# Patient Record
Sex: MALE | Race: WHITE
[De-identification: names, ages, dates, MRNs, and addresses within clinical notes are randomized per-mention and may not be internally consistent; named-entity substitution may affect disease eponyms.]

---

## 2022-07-24 ENCOUNTER — HOSPITAL ENCOUNTER (INPATIENT)
Dept: HOSPITAL 94 - ADULT MH | Age: 57
LOS: 3 days | Discharge: HOME | DRG: 753 | End: 2022-07-27
Attending: PSYCHIATRY & NEUROLOGY | Admitting: PSYCHIATRY & NEUROLOGY
Payer: MEDICAID

## 2022-07-24 VITALS — DIASTOLIC BLOOD PRESSURE: 89 MMHG | SYSTOLIC BLOOD PRESSURE: 153 MMHG

## 2022-07-24 VITALS — SYSTOLIC BLOOD PRESSURE: 178 MMHG | DIASTOLIC BLOOD PRESSURE: 95 MMHG

## 2022-07-24 VITALS — WEIGHT: 252.87 LBS | HEIGHT: 65 IN | BODY MASS INDEX: 42.13 KG/M2

## 2022-07-24 DIAGNOSIS — Z28.310: ICD-10-CM

## 2022-07-24 DIAGNOSIS — F65.2: ICD-10-CM

## 2022-07-24 DIAGNOSIS — Z59.00: ICD-10-CM

## 2022-07-24 DIAGNOSIS — R45.851: ICD-10-CM

## 2022-07-24 DIAGNOSIS — F22: ICD-10-CM

## 2022-07-24 DIAGNOSIS — I10: ICD-10-CM

## 2022-07-24 DIAGNOSIS — F43.12: ICD-10-CM

## 2022-07-24 DIAGNOSIS — F31.9: Primary | ICD-10-CM

## 2022-07-24 DIAGNOSIS — Z79.899: ICD-10-CM

## 2022-07-24 DIAGNOSIS — E78.00: ICD-10-CM

## 2022-07-24 DIAGNOSIS — Z86.16: ICD-10-CM

## 2022-07-24 DIAGNOSIS — Z65.3: ICD-10-CM

## 2022-07-24 PROCEDURE — 80061 LIPID PANEL: CPT

## 2022-07-24 PROCEDURE — 36415 COLL VENOUS BLD VENIPUNCTURE: CPT

## 2022-07-24 PROCEDURE — 87081 CULTURE SCREEN ONLY: CPT

## 2022-07-24 PROCEDURE — 83036 HEMOGLOBIN GLYCOSYLATED A1C: CPT

## 2022-07-24 PROCEDURE — 99285 EMERGENCY DEPT VISIT HI MDM: CPT

## 2022-07-24 SDOH — ECONOMIC STABILITY - HOUSING INSECURITY: HOMELESSNESS UNSPECIFIED: Z59.00

## 2022-07-24 NOTE — NUR
Admit note:   [facility] for Gravely 
disabled from [facility] at 1330.  Pt endorses being attacked by demons, prophesizing a third 
war and creating a new constitution.  Pt is unwilling to clothe self. Pt is tangential, 
delusional and hallucinating.   Pt states having history of PTSD.

## 2022-07-25 VITALS — DIASTOLIC BLOOD PRESSURE: 71 MMHG | SYSTOLIC BLOOD PRESSURE: 130 MMHG

## 2022-07-25 VITALS — DIASTOLIC BLOOD PRESSURE: 87 MMHG | SYSTOLIC BLOOD PRESSURE: 130 MMHG

## 2022-07-25 LAB
CHOLEST SERPL-MCNC: 176 MG/DL (ref 0–200)
CHOLEST/HDLC SERPL: 4 {RATIO} (ref 0–4.99)
HBA1C MFR BLD: 5.7 % (ref 4.5–6.2)
HDLC SERPL-MCNC: 44 MG/DL (ref 35–60)
LDLC SERPL DIRECT ASSAY-MCNC: 108 MG/DL (ref 50–100)
TRIGL SERPL-MCNC: 96 MG/DL (ref 20–135)
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
## 2022-07-25 NOTE — NUR
Progress Note:



Problem: Pt admitted  to [facility] on a 5150 for Gravely disabled from 
[facility] .  Pt endorses being attacked by demons, prophesies a third war and creating a new 
constitution.  Pt is unwilling to clothe self. Pt is tangential, delusional and 
hallucinating.   Pt states having history of PTSD.



Intervention: Maintained a safe and supportive environment, administered scheduled and PRN 
medications. Provided clear and simple instructions, provided active listening and positive 
encouragement, encouraged participation on the unit.



Response:Patient in room sitting in chair staring out window at the beginning of the shift. 
The pt was holding a bible. Patient 1:1, pt states he sometimes hears voices, V/H, denies 
H/I S/I. The The patient began to become labile and reported that he wanted to go home and 
did not want to be here. The pt was then offered a sandwich to which he was extremely 
grateful. The patient was given snacks and water, an explanation of the evening schedule was 
given. The pt calmed down. The pt ate snack in the community room. The patient took evening 
meds w/o complications and went to bed shortly after.



Plan:  

Pt. requires crisis stabilization. pt. continues to require a safe and supportive 
environment. And pharmacological intervention.

## 2022-07-26 VITALS — SYSTOLIC BLOOD PRESSURE: 169 MMHG | DIASTOLIC BLOOD PRESSURE: 72 MMHG

## 2022-07-26 VITALS — SYSTOLIC BLOOD PRESSURE: 134 MMHG | DIASTOLIC BLOOD PRESSURE: 77 MMHG

## 2022-07-26 NOTE — NUR
NURSING PROGRESS NOTE: 



Problem: Patient was seen at [facility] and placed on a 5150 for DTS/GD. Patient 
reported "I am thinking about hurting myself. Patient presented delusional and tangential 
"Patient was thinking about throwing himself in front of a train.  Patient believed he was 
surrounded by Gogo gonzalez for the last 2 days.  Pt was hearing voices and felt he was 
being attacked by demons.

Interventions : Maintained a safe and supportive environment, administered medication per 
orders with no adverse side effects, provided clear and simple instructions, provided active 
listening and positive encouragement, reality orientation as needed, encouraged 
participation on the unit and with ADLs. 



Response :  Received patient sleeping at shift change. Pt woke as the sun was coming up. 
Noted he was standing in the sun by the window with his shirt off. Pts arms were raised and 
he was praising Michael. Pt was compliant with care and his morning heart medication. Pt 
presented sad during one on one assessment. When asked how he was pt stated I have had 
better days. Im just not sure what is going to happen. Pt continues I am afraid of 
going to hell. I am struggling with lust and I dont like it. Writer talked about 
different distraction techniques, such as walking, listening to head phones, exercise. Pt 
attended patio break with charge nurse and peers, states that was a good distraction. Pt 
continues to pace the black talking peers about the Lord. Pt said he was given a word 
colostomy. I then saw Adair and was watching his walk, I asked about it and he said he 
had a colostomy. Pt then said I prayed over him and told him he would be healed. Pt also 
told writer that one of his peers confided in him telling him their aunt was a practicing 
witch.



Plan : Pt is having delusions about demons and Satan worshipers. Pt requires mediation 
initiation and titration to a therapeutic level. Pt has some suicidal ideation and is unable 
to safety plan.

## 2022-07-26 NOTE — NUR
PSYCHOSOCIAL ASSESSMENT



Pt. is a 56 year old single male admitted on a 5150 for GD.  Pt endorses being attacked by 
demons, prophesizing a third war and creating a new constitution.  Pt is unwilling to clothe 
self. Pt is tangential, delusional and hallucinating.   Pt stated having history of PTSD.



Met with Pt. today. Pt. reported that he is currently on Probation in [facility] since 
last fall, he still has two more years until he completes his probation. [place] cannot leave the 
[place]. He reported he is homeless and would like to live with friends from [place] but does 
not have a plan for this at this time. He has not family in the area as his mom moved with 
his brother last year to [place]. Pt. grew up in [place], [place] but has been living in 
[place] since 2012. He reported he has no job and no income but does have some savings in 
his bank account that he lives off of. He reported that he does not care for the [facility] and 
does not plan on returning there. He denied using any substances, marijuana or alcohol. 



Spoke to Hope from [facility] who reported that he had been in a PHF in [facility] at 
the beginning of this year. His Medi-[place]arek got changed to [place] during this time, he never 
lived in that county. He reported he doesnt know how to change his Medi-Darek back. Will give 
him the number for Medi-darek for him to call. He reported he doesnt have a PCP or any outpt. 
Mental health services.



MSE:

He appeared a bit disheveled in his appearance, he was wearing scrubs, and his hygiene was 
fair. His thought content contained delusional content with Anabaptism themes (end of the 
world). His thought process was linear. He was calm, compliant and pleasant to work with. 



Amy Larkin, DELONTE

## 2022-07-26 NOTE — NUR
Nursing Progress Note:



Problem: Pt admitted  to [facility] on a 5150 for Gravely disabled from 
[facility] .  Pt endorses being attacked by demons, prophesies a third war and creating a new 
constitution.  Pt is unwilling to clothe self. Pt is tangential, delusional and 
hallucinating.   Pt states having history of PTSD.



Intervention: Maintained a safe and supportive environment, administered scheduled and PRN 
medications. Provided clear and simple instructions, provided active listening and positive 
encouragement, encouraged participation on the unit.



Response:Patient in room sitting on bed with bible. Pt 1:1, pt +A/H, denies S/I H/I. The pt 
talked about the bible and how we as people should all praise god. The pt was cooperative 
and polite with flight of ideas, jumping from subject to subject. The pt ate snack in the 
community room. The patient took evening meds w/o complications and deborah to sleep shortly 
after.



Plan:  

Pt. requires crisis stabilization. pt. continues to require a safe and supportive 
environment. And pharmacological intervention.

## 2022-07-27 VITALS — DIASTOLIC BLOOD PRESSURE: 76 MMHG | SYSTOLIC BLOOD PRESSURE: 131 MMHG

## 2022-07-27 VITALS — SYSTOLIC BLOOD PRESSURE: 131 MMHG

## 2022-07-27 NOTE — NUR
Initial: Pt admit for PTSD, delusion, and bipolar disorder. Currently on a regular diet and 
eating well, documented with mostly 100% PO intake throughout LOS meeting estimated nutrient 
needs. [facility] 7/25. No nutrition diagnosis at this time. Will continue to follow.

Recommendations:

1) Continue regular diet; monitor need for additional protein for satiety

2) Bowel care PRN

3) Weekly scaled weights

-------------------------------------------------------------------------------

Addendum: 07/27/22 at 1024 by Yulissa Childs RD

-------------------------------------------------------------------------------

Amended: Links added.

## 2022-07-27 NOTE — NUR
DISCHARGE NOTE:





Patient was discharged from unit at 1720. Pt left with all personal belongings to include 
his laptop and written prescriptions.  Pt was [facility] 
[facility]. Pt was A&O x4. Pt is follow up at [facility]. Pt verbalized understanding 
of the importance of follow up with his mental health. Pt was given resources and discharged 
to [facility].

## 2022-07-27 NOTE — NUR
Nursing Progress Note:



Problem: Pt admitted  to [facility] on a 5150 for Gravely disabled from 
[facility] .  Pt endorses being attacked by demons, prophesies a third war and creating a new 
constitution.  Pt is unwilling to clothe self. Pt is tangential, delusional and 
hallucinating.   Pt states having history of PTSD.



Intervention: Maintained a safe and supportive environment, administered scheduled and PRN 
medications. Provided clear and simple instructions, provided active listening and positive 
encouragement, encouraged participation on the unit.



Response:Patient lying in room at tshift change. Pt 1:1 pt reports, " hearing voices at 
times." pt denies all other MH S/S. Patient focused on God and has delusions about God 
giving him special gifts. Patient ate snack in community room. Patient took evening 
medications w/o complications. pt went to bed shortly after.

Plan:  

Pt. requires crisis stabilization. pt. continues to require a safe and supportive 
environment. And pharmacological intervention.

## 2022-12-06 ENCOUNTER — HOSPITAL ENCOUNTER (EMERGENCY)
Dept: HOSPITAL 94 - ER | Age: 57
LOS: 1 days | Discharge: PSYCHIATRIC OR SUBSTANCE USE FACILITY | End: 2022-12-07
Payer: MEDICAID

## 2022-12-06 VITALS — BODY MASS INDEX: 29.41 KG/M2 | WEIGHT: 249.12 LBS | HEIGHT: 77 IN

## 2022-12-06 DIAGNOSIS — F32.A: ICD-10-CM

## 2022-12-06 DIAGNOSIS — Z20.822: ICD-10-CM

## 2022-12-06 DIAGNOSIS — F29: Primary | ICD-10-CM

## 2022-12-06 DIAGNOSIS — F41.9: ICD-10-CM

## 2022-12-06 LAB
ALBUMIN SERPL BCP-MCNC: 3.9 G/DL (ref 3.4–5)
ALBUMIN/GLOB SERPL: 1.1 {RATIO} (ref 1.1–1.5)
ALP SERPL-CCNC: 107 IU/L (ref 46–116)
ALT SERPL W P-5'-P-CCNC: < 6 U/L (ref 12–78)
AMPHETAMINES UR QL SCN: NEGATIVE
ANION GAP SERPL CALCULATED.3IONS-SCNC: 10 MMOL/L (ref 8–16)
AST SERPL W P-5'-P-CCNC: 24 U/L (ref 10–37)
BARBITURATES UR QL SCN: NEGATIVE
BASOPHILS # BLD AUTO: 0.1 X10'3 (ref 0–0.2)
BASOPHILS NFR BLD AUTO: 0.7 % (ref 0–1)
BENZODIAZ UR QL SCN: NEGATIVE
BILIRUB SERPL-MCNC: 0.9 MG/DL (ref 0.1–1)
BUN SERPL-MCNC: 12 MG/DL (ref 7–18)
BUN/CREAT SERPL: 11.2 (ref 5.4–32)
BZE UR QL SCN: NEGATIVE
CALCIUM SERPL-MCNC: 9.4 MG/DL (ref 8.5–10.1)
CANNABINOIDS UR QL SCN: NEGATIVE
CHLORIDE SERPL-SCNC: 103 MMOL/L (ref 99–107)
CO2 SERPL-SCNC: 24.2 MMOL/L (ref 24–32)
CREAT SERPL-MCNC: 1.07 MG/DL (ref 0.6–1.1)
EOSINOPHIL # BLD AUTO: 0.2 X10'3 (ref 0–0.9)
EOSINOPHIL NFR BLD AUTO: 1.5 % (ref 0–6)
ERYTHROCYTE [DISTWIDTH] IN BLOOD BY AUTOMATED COUNT: 13.5 % (ref 11.5–14.5)
ETHANOL SERPL-MCNC: < 0.01 GM/DL (ref 0–0.01)
GFR SERPL CREATININE-BSD FRML MDRD: 72 ML/MIN
GLUCOSE SERPL-MCNC: 104 MG/DL (ref 70–104)
HCT VFR BLD AUTO: 42.9 % (ref 42–52)
HGB BLD-MCNC: 14.4 G/DL (ref 14–17.9)
LYMPHOCYTES # BLD AUTO: 2.8 X10'3 (ref 1.1–4.8)
LYMPHOCYTES NFR BLD AUTO: 24.2 % (ref 21–51)
MCH RBC QN AUTO: 30.8 PG (ref 27–31)
MCHC RBC AUTO-ENTMCNC: 33.6 G/DL (ref 33–36.5)
MCV RBC AUTO: 91.4 FL (ref 78–98)
METHADONE UR QL SCN: NEGATIVE
MONOCYTES # BLD AUTO: 1.4 X10'3 (ref 0–0.9)
MONOCYTES NFR BLD AUTO: 12.1 % (ref 2–12)
NEUTROPHILS # BLD AUTO: 7.2 X10'3 (ref 1.8–7.7)
NEUTROPHILS NFR BLD AUTO: 61.5 % (ref 42–75)
OPIATES UR QL SCN: NEGATIVE
PCP UR QL SCN: NEGATIVE
PLATELET # BLD AUTO: 462 X10'3 (ref 140–440)
PMV BLD AUTO: 6.5 FL (ref 7.4–10.4)
POTASSIUM SERPL-SCNC: 3.5 MMOL/L (ref 3.5–5.1)
PROT SERPL-MCNC: 7.6 G/DL (ref 6.4–8.2)
RBC # BLD AUTO: 4.69 X10'6 (ref 4.7–6.1)
SODIUM SERPL-SCNC: 137 MMOL/L (ref 135–145)
WBC # BLD AUTO: 11.7 X10'3 (ref 4.5–11)

## 2022-12-06 PROCEDURE — 85025 COMPLETE CBC W/AUTO DIFF WBC: CPT

## 2022-12-06 PROCEDURE — 80053 COMPREHEN METABOLIC PANEL: CPT

## 2022-12-06 PROCEDURE — 80305 DRUG TEST PRSMV DIR OPT OBS: CPT

## 2022-12-06 PROCEDURE — 99285 EMERGENCY DEPT VISIT HI MDM: CPT

## 2022-12-06 PROCEDURE — 80320 DRUG SCREEN QUANTALCOHOLS: CPT

## 2022-12-06 PROCEDURE — 36415 COLL VENOUS BLD VENIPUNCTURE: CPT

## 2022-12-06 PROCEDURE — 81003 URINALYSIS AUTO W/O SCOPE: CPT

## 2022-12-06 PROCEDURE — 84443 ASSAY THYROID STIM HORMONE: CPT

## 2022-12-06 PROCEDURE — 84439 ASSAY OF FREE THYROXINE: CPT

## 2022-12-06 PROCEDURE — 87811 SARS-COV-2 COVID19 W/OPTIC: CPT

## 2022-12-07 VITALS — SYSTOLIC BLOOD PRESSURE: 130 MMHG | DIASTOLIC BLOOD PRESSURE: 80 MMHG

## 2022-12-07 LAB
CLARITY UR: CLEAR
COLOR UR: (no result)
GLUCOSE UR STRIP-MCNC: NEGATIVE MG/DL
HGB UR QL STRIP: NEGATIVE
KETONES UR STRIP-MCNC: NEGATIVE MG/DL
LEUKOCYTE ESTERASE UR QL STRIP: NEGATIVE
NITRITE UR QL STRIP: NEGATIVE
PH UR STRIP: 5.5 [PH] (ref 4.8–8)
PROT UR QL STRIP: NEGATIVE MG/DL
SP GR UR STRIP: <=1.005 (ref 1–1.03)
URN COLLECT METHOD CLASS: (no result)
UROBILINOGEN UR STRIP-MCNC: 0.2 E.U/DL (ref 0.2–1)

## 2022-12-07 NOTE — NUR
spoke to kezia from [facility] given nurse to nurse report ,all question 
answered,requesting for UA & TSH Results faxed at [phone].will fax when ua 
get resulted.

## 2022-12-07 NOTE — NUR
The patient is resting on his bed. He was cooperative with staff. He reports 
that he is hearing voices that are saying "abusive stuff"

## 2022-12-07 NOTE — NUR
spoke to argenis at pharmacy regarding pt med rec as pt has not taken his meds in 
6 weeks and does not remember the meds and the dosage ,as per deacon he 
will call the [facility] pharmacy to complete the med rec.